# Patient Record
Sex: FEMALE | Race: WHITE | Employment: OTHER | URBAN - METROPOLITAN AREA
[De-identification: names, ages, dates, MRNs, and addresses within clinical notes are randomized per-mention and may not be internally consistent; named-entity substitution may affect disease eponyms.]

---

## 2020-12-10 ENCOUNTER — HOSPITAL ENCOUNTER (OUTPATIENT)
Dept: PREADMISSION TESTING | Age: 70
Discharge: HOME | End: 2020-12-10
Payer: MEDICARE

## 2020-12-10 PROCEDURE — 87635 SARS-COV-2 COVID-19 AMP PRB: CPT

## 2020-12-11 LAB — SARS-COV-2, COV2NT: NOT DETECTED

## 2020-12-14 ENCOUNTER — HOSPITAL ENCOUNTER (OUTPATIENT)
Age: 70
Setting detail: OUTPATIENT SURGERY
Discharge: HOME | End: 2020-12-14
Attending: INTERNAL MEDICINE | Admitting: INTERNAL MEDICINE
Payer: MEDICARE

## 2020-12-14 VITALS
SYSTOLIC BLOOD PRESSURE: 132 MMHG | RESPIRATION RATE: 18 BRPM | TEMPERATURE: 98.1 F | HEIGHT: 67 IN | OXYGEN SATURATION: 94 % | BODY MASS INDEX: 15.78 KG/M2 | DIASTOLIC BLOOD PRESSURE: 57 MMHG | HEART RATE: 70 BPM | WEIGHT: 100.56 LBS

## 2020-12-14 PROCEDURE — 88342 IMHCHEM/IMCYTCHM 1ST ANTB: CPT

## 2020-12-14 PROCEDURE — G0500 MOD SEDAT ENDO SERVICE >5YRS: HCPCS | Performed by: INTERNAL MEDICINE

## 2020-12-14 PROCEDURE — 77030039961 HC KT CUST COLON BSC -D: Performed by: INTERNAL MEDICINE

## 2020-12-14 PROCEDURE — 74011250636 HC RX REV CODE- 250/636: Performed by: INTERNAL MEDICINE

## 2020-12-14 PROCEDURE — 77030040361 HC SLV COMPR DVT MDII -B: Performed by: INTERNAL MEDICINE

## 2020-12-14 PROCEDURE — 88305 TISSUE EXAM BY PATHOLOGIST: CPT

## 2020-12-14 PROCEDURE — 2709999900 HC NON-CHARGEABLE SUPPLY: Performed by: INTERNAL MEDICINE

## 2020-12-14 PROCEDURE — 76040000008: Performed by: INTERNAL MEDICINE

## 2020-12-14 RX ORDER — SODIUM CHLORIDE 0.9 % (FLUSH) 0.9 %
5-40 SYRINGE (ML) INJECTION AS NEEDED
Status: CANCELLED | OUTPATIENT
Start: 2020-12-14

## 2020-12-14 RX ORDER — FENTANYL CITRATE 50 UG/ML
100 INJECTION, SOLUTION INTRAMUSCULAR; INTRAVENOUS
Status: DISCONTINUED | OUTPATIENT
Start: 2020-12-14 | End: 2020-12-14 | Stop reason: HOSPADM

## 2020-12-14 RX ORDER — ATROPINE SULFATE 0.1 MG/ML
0.5 INJECTION INTRAVENOUS
Status: CANCELLED | OUTPATIENT
Start: 2020-12-14 | End: 2020-12-15

## 2020-12-14 RX ORDER — DIPHENHYDRAMINE HYDROCHLORIDE 50 MG/ML
50 INJECTION, SOLUTION INTRAMUSCULAR; INTRAVENOUS ONCE
Status: CANCELLED | OUTPATIENT
Start: 2020-12-14 | End: 2020-12-14

## 2020-12-14 RX ORDER — LUBIPROSTONE 8 UG/1
8 CAPSULE, GELATIN COATED ORAL 2 TIMES DAILY WITH MEALS
Status: ON HOLD | COMMUNITY
End: 2022-08-02

## 2020-12-14 RX ORDER — EPINEPHRINE 0.1 MG/ML
1 INJECTION INTRACARDIAC; INTRAVENOUS
Status: CANCELLED | OUTPATIENT
Start: 2020-12-14 | End: 2020-12-15

## 2020-12-14 RX ORDER — FLUMAZENIL 0.1 MG/ML
0.2 INJECTION INTRAVENOUS
Status: CANCELLED | OUTPATIENT
Start: 2020-12-14 | End: 2020-12-14

## 2020-12-14 RX ORDER — SODIUM CHLORIDE 0.9 % (FLUSH) 0.9 %
5-40 SYRINGE (ML) INJECTION EVERY 8 HOURS
Status: CANCELLED | OUTPATIENT
Start: 2020-12-14

## 2020-12-14 RX ORDER — DEXTROMETHORPHAN/PSEUDOEPHED 2.5-7.5/.8
1.2 DROPS ORAL
Status: CANCELLED | OUTPATIENT
Start: 2020-12-14

## 2020-12-14 RX ORDER — NALOXONE HYDROCHLORIDE 0.4 MG/ML
0.4 INJECTION, SOLUTION INTRAMUSCULAR; INTRAVENOUS; SUBCUTANEOUS
Status: DISCONTINUED | OUTPATIENT
Start: 2020-12-14 | End: 2020-12-14 | Stop reason: HOSPADM

## 2020-12-14 RX ORDER — BISMUTH SUBSALICYLATE 262 MG
1 TABLET,CHEWABLE ORAL DAILY
COMMUNITY

## 2020-12-14 RX ORDER — SODIUM CHLORIDE 9 MG/ML
1000 INJECTION, SOLUTION INTRAVENOUS CONTINUOUS
Status: CANCELLED | OUTPATIENT
Start: 2020-12-14 | End: 2020-12-14

## 2020-12-14 RX ORDER — ALBUTEROL SULFATE 90 UG/1
AEROSOL, METERED RESPIRATORY (INHALATION)
COMMUNITY

## 2020-12-14 RX ORDER — SODIUM CHLORIDE 9 MG/ML
125 INJECTION, SOLUTION INTRAVENOUS CONTINUOUS
Status: DISCONTINUED | OUTPATIENT
Start: 2020-12-14 | End: 2020-12-14 | Stop reason: HOSPADM

## 2020-12-14 RX ORDER — MIDAZOLAM HYDROCHLORIDE 1 MG/ML
.25-5 INJECTION, SOLUTION INTRAMUSCULAR; INTRAVENOUS
Status: DISCONTINUED | OUTPATIENT
Start: 2020-12-14 | End: 2020-12-14 | Stop reason: HOSPADM

## 2020-12-14 RX ADMIN — SODIUM CHLORIDE 125 ML/HR: 900 INJECTION, SOLUTION INTRAVENOUS at 08:40

## 2020-12-14 NOTE — DISCHARGE INSTRUCTIONS
[facility]  [id number]  1950    EGD DISCHARGE INSTRUCTIONS  Discomfort:  Sore throat- throat lozenges or warm salt water gargle  redness at IV site- apply warm compress to area; if redness or soreness persist- contact your physician  Gaseous discomfort- walking, belching will help relieve any discomfort  You may not operate a vehicle until the next day  You may not engage in an occupation involving machinery or appliances until the next day  You may not drink alcoholic beverages until the next day  Avoid making any critical decisions for at least 24 hour    DIET   You may not resume your regular diet. Antireflux diet. ACTIVITY  You may not resume your normal daily activities   Spend the remainder of the day resting -  avoid any strenuous activity. CALL M.D. ANY SIGN OF   Increasing pain, nausea, vomiting  Abdominal distension (swelling)  New increased bleeding (oral or rectal)  Fever (chills)  Pain in chest area  Bloody discharge from nose or mouth  Shortness of breath     You may no take any Advil, Aspirin, Ibuprofen, Motrin, Aleve, or Goodys ONLY  Tylenol as needed for pain. Follow-up Instructions: Follow-up in the office as scheduled or make a follow-up appointment in 2 weeks. Citlali Higgins MD  December 14, 2020      DISCHARGE SUMMARY from Nurse    PATIENT INSTRUCTIONS:    After general anesthesia or intravenous sedation, for 24 hours or while taking prescription Narcotics:  · Limit your activities  · Do not drive and operate hazardous machinery  · Do not make important personal or business decisions  · Do  not drink alcoholic beverages  · If you have not urinated within 8 hours after discharge, please contact your surgeon on call.     Report the following to your surgeon:  · Excessive pain, swelling, redness or odor of or around the surgical area  · Temperature over 100.5  · Nausea and vomiting lasting longer than 4 hours or if unable to take medications  · Any signs of decreased circulation or nerve impairment to extremity: change in color, persistent  numbness, tingling, coldness or increase pain  · Any questions    What to do at Home:  Recommended activity: as above,         *  Please give a list of your current medications to your Primary Care Provider. *  Please update this list whenever your medications are discontinued, doses are      changed, or new medications (including over-the-counter products) are added. *  Please carry medication information at all times in case of emergency situations. These are general instructions for a healthy lifestyle:    No smoking/ No tobacco products/ Avoid exposure to second hand smoke  Surgeon General's Warning:  Quitting smoking now greatly reduces serious risk to your health. Obesity, smoking, and sedentary lifestyle greatly increases your risk for illness    A healthy diet, regular physical exercise & weight monitoring are important for maintaining a healthy lifestyle    You may be retaining fluid if you have a history of heart failure or if you experience any of the following symptoms:  Weight gain of 3 pounds or more overnight or 5 pounds in a week, increased swelling in our hands or feet or shortness of breath while lying flat in bed. Please call your doctor as soon as you notice any of these symptoms; do not wait until your next office visit. The discharge information has been reviewed with the patient and Felisha Coyle. The patient and Felisha Coyle verbalized understanding. Discharge medications reviewed with the patient and Felisha Coyle and appropriate educational materials and side effects teaching were provided.   ___________________________________________________________________________________________________________________________________    Patient armband removed and shredded

## 2020-12-14 NOTE — H&P
Assessment/Plan  # Detail Type Description    1. Assessment Dysphagia (R13.10). Patient Plan dysphagia to solids > liquid started again 2 to 3 months ago. It has been stable . she lost 12 lbs in the last 6 months by juana active and taking floraxatine prozac because she has been very depressed. her depression is better. EGD in 2016 was dilated to 40 Fr         2. Assessment Lower abdominal pain, unspecified (R10.30). Patient Plan for the past 10 years has been having intermittent severe lower abdominal cramping that lasts for 24 hours with a feeling of mid epigastric pain has to stay in fetal position with bloating every 10 to 15 minutes . the pain occurs about once a month during marcos time she feels nauseated with diarrhea. She is  she has 2 to 3 small soft to loose bm daily and sometimes it is every 2 days if she has not eaten enough. had 2 negative colonoscopies last by Dr Yue Omalley mild diverticulosis throughout the colon. For now recommended high fiber diet, Metamucil and Amitiza 8 mcg bid  To see in FU in one month  she had hemorrhoidectomy 25 to 30 years ago. she has rarely rectal bleeding if she strains a lot on the tissue                 This 69year old female presents for Dysphagia and Irritable bowel syndrome. History of Present Illness:  1. Dysphagia   The difficulty in swallowing began 6 months ago. The symptoms are worsened and occur occasionally. The symptoms occur with solids and liquids and cause choking and gagging. Additional information: BM . 2 x daily. 2.  Irritable bowel syndrome   Onset: 1 month ago. Patient description is cramping. It occurs monthly.           PROBLEM LIST:     Problem Description Onset Date Chronic Clinical Status Notes   Pseudodiverticulum of the rectum 08/10/2016 N     Congenital esophageal fistula 08/10/2016 N     Suprapubic pain 06/01/2016 N     Difficulty swallowing solids 06/01/2016 N     Acquired hypothyroidism 02/24/2014 Y     Hypothyroidism 2014 Y     Hyperlipidemia 2014 Y     Asthma 2014 Y     Depressive disorder 2014 Y     Elevated levels of transaminase & lactic acid dehydrogenase 2014 Y               PAST MEDICAL/SURGICAL HISTORY   (Detailed)    Disease/disorder Onset Date Management Date Comments   Dysphagia  EGD w/biopsy 2016      Colonoscopy 2005    Diverticular disease         shoulder surgery     Hip fracture         hemorrhoidectomy       Anal Fissure Repair x2           Family History  (Detailed)  Relationship Family Member Name  Age at Death Condition Onset Age Cause of Death   Father  Y  Dementia  N   Mother  Y  Heart disease; Pacemaker  N         Social History:  (Detailed)  Tobacco use reviewed. Preferred language is [place]. MARITAL STATUS/FAMILY/SOCIAL SUPPORT  Marital status: Single   Tobacco use status: Never smoked tobacco.  Smoking status: Never smoker. TOBACCO SCREENING:  Patient has never used tobacco. Patient has not used tobacco in the last 30 days. Patient has not used smokeless tobacco in the last 30 days. SMOKING STATUS  Type Smoking Status Usage Per Day Years Used Pack Years Total Pack Years    Never smoker         TOBACCO/VAPING EXPOSURE  No passive smoke exposure. ALCOHOL  There is a history of alcohol use. Type: Wine. 2 glasses consumed daily. CAFFEINE  The patient uses caffeine: coffee. LIFESTYLE  Moderate activity level. Exercises 3-4 times a week. DIET  healthy.             Medications (active prior to today)  Medication Name Sig Description Start Date Stop Date Refilled Rx Elsewhere   docusate sodium 100 mg tablet take 1 tablet by oral route  every day at bedtime as needed 2016   N   hydrocortisone 1 % topical cream with perineal applicator apply by topical route 2- 4 times every day a thin layer to the affected area(s) 2020   N   LEVOTHYROXINE 25 MCG TABLET take 1 tablet by mouth daily 2020 N   lidocaine 5 % topical patch apply 1 patch by transdermal route  every day (May wear up to 12hours.) 04/06/2020  40/72/4861 N   BYSTOLIC 5 MG TABLET take 1 tablet by mouth once daily 06/11/2020 06/11/2020 N   omeprazole 20 mg capsule,delayed release TAKE 1 CAPSULE BY ORAL ROUTE EVERY DAY BEFORE A MEAL 07/14/2020 07/14/2020 N   Vitamin D3 25 mcg (1,000 unit) capsule Take 1 Tablet By Oral Route Daily 07/28/2020   N   Centrum Chewables 8 mg iron-400 mcg-10 mcg tablet  07/28/2020   N   albuterol sulfate HFA 90 mcg/actuation aerosol inhaler inhale 2 puff by inhalation route  every 4 - 6 hours as needed 44/66/5855   N   Bystolic 5 mg tablet take 1 tablet by oral route  every day 07/28/2020   N   fluoxetine 20 mg capsule TAKE ONE CAPSULE BY MOUTH EVERY MORNING 09/09/2020 11/24/2020 09/09/2020 N   temazepam 22.5 mg capsule take 1 capsule by oral route  every day at bedtime as needed for insomnia 10/12/2020  10/12/2020 N   meloxicam 7.5 mg tablet take 1 tablet by oral route  every day as needed for arthritis pain 11/18/2020 11/18/2020 N   hydrocortisone 2.5 % topical cream apply by topical route 2-4 times every day a thin layer to the affected area(s) 11/23/2020 11/23/2020 N     Medication Reconciliation  Medications reconciled today.   Medication Reviewed  Adherence Medication Name Sig Desc Elsewhere Status   taking as directed docusate sodium 100 mg tablet take 1 tablet by oral route  every day at bedtime as needed N Verified   taking as directed hydrocortisone 1 % topical cream with perineal applicator apply by topical route 2- 4 times every day a thin layer to the affected area(s) N Verified   taking as directed LEVOTHYROXINE 25 MCG TABLET take 1 tablet by mouth daily N Verified   taking as directed lidocaine 5 % topical patch apply 1 patch by transdermal route  every day (May wear up to 12hours.) N Verified   taking as directed BYSTOLIC 5 MG TABLET take 1 tablet by mouth once daily N Verified   taking as directed omeprazole 20 mg capsule,delayed release TAKE 1 CAPSULE BY ORAL ROUTE EVERY DAY BEFORE A MEAL N Verified   taking as directed Vitamin D3 25 mcg (1,000 unit) capsule Take 1 Tablet By Oral Route Daily N Verified   taking as directed Centrum Chewables 8 mg iron-400 mcg-10 mcg tablet  N Verified   taking as directed albuterol sulfate HFA 90 mcg/actuation aerosol inhaler inhale 2 puff by inhalation route  every 4 - 6 hours as needed N Verified   taking as directed Bystolic 5 mg tablet take 1 tablet by oral route  every day N Verified   taking as directed temazepam 22.5 mg capsule take 1 capsule by oral route  every day at bedtime as needed for insomnia N Verified   taking as directed meloxicam 7.5 mg tablet take 1 tablet by oral route  every day as needed for arthritis pain N Verified   taking as directed hydrocortisone 2.5 % topical cream apply by topical route 2-4 times every day a thin layer to the affected area(s) N Verified     Medications (Added, Continued or Stopped today)  Start Date Medication Directions PRN Status PRN Reason Instruction Stop Date   07/28/2020 albuterol sulfate HFA 90 mcg/actuation aerosol inhaler inhale 2 puff by inhalation route  every 4 - 6 hours as needed N      11/24/2020 Amitiza 8 mcg capsule take 1 capsule by oral route 2 times every day with food and water N      65/53/7089 BYSTOLIC 5 MG TABLET take 1 tablet by mouth once daily N      97/43/3338 Bystolic 5 mg tablet take 1 tablet by oral route  every day N      07/28/2020 Centrum Chewables 8 mg iron-400 mcg-10 mcg tablet  N      05/31/2016 docusate sodium 100 mg tablet take 1 tablet by oral route  every day at bedtime as needed N      09/09/2020 fluoxetine 20 mg capsule TAKE ONE CAPSULE BY MOUTH EVERY MORNING N   11/24/2020 01/28/2020 hydrocortisone 1 % topical cream with perineal applicator apply by topical route 2- 4 times every day a thin layer to the affected area(s) N      11/23/2020 hydrocortisone 2.5 % topical cream apply by topical route 2-4 times every day a thin layer to the affected area(s) N      01/30/2020 LEVOTHYROXINE 25 MCG TABLET take 1 tablet by mouth daily N      11/24/2020 Levsin/SL 0.125 mg sublingual tablet take 1 tablet by oral route  every 3 hours as needed for abdominal pain N      04/06/2020 lidocaine 5 % topical patch apply 1 patch by transdermal route  every day (May wear up to 12hours. ) N      11/18/2020 meloxicam 7.5 mg tablet take 1 tablet by oral route  every day as needed for arthritis pain N      07/14/2020 omeprazole 20 mg capsule,delayed release TAKE 1 CAPSULE BY ORAL ROUTE EVERY DAY BEFORE A MEAL N      10/12/2020 temazepam 22.5 mg capsule take 1 capsule by oral route  every day at bedtime as needed for insomnia N      07/28/2020 Vitamin D3 25 mcg (1,000 unit) capsule Take 1 Tablet By Oral Route Daily N        Allergies:  Ingredient Reaction (Severity) Medication Name Comment   CITALOPRAM nausea; dizzy; drowsiness     CODEINE gi distress (Unknown)     Reviewed, no changes. Date/Time  Temp  Pulse  BP  MAP (Calculated)  Arterial Line 1 BP (mmHg)  BP Patient Position  Resp  SpO2  O2 Device  O2 Flow Rate (L/min)  Pre/Post Ductal  Weight    12/14/20 0855  --  73  196/81Abnormal    119  --  --  0Abnormal    98 %  --  --  --  --    12/14/20 0850  --  71  186/86Abnormal    119  --  --  --  --  --  --  --  --    12/14/20 0818  97.8 °F (36.6 °C)  78  166/84Abnormal    111  --  --  18  97 %  Room air  --  --  45.6 kg (100 lb 9 oz)          PHYSICAL EXAM:  Exam Findings Details   Constitutional Normal No acute distress. Well Nourished. Well developed.    Eyes Normal General - Right: Normal, Left: Normal. Conjunctiva - Right: Normal, Left: Normal. Sclera - Right: Normal, Left: Normal. Cornea - Right: Normal, Left: Normal. Pupil - Right: Normal, Left: Normal.   Nose/Mouth/Throat Normal Lips/teeth/gums - Normal. Tongue - Normal. Buccal mucosa - Normal. Palate & uvula - Normal.   Neck Exam Normal Inspection - Normal. Palpation - Normal. Thyroid gland - Normal. Cervical lymph nodes - Normal.   Respiratory Normal Inspection - Normal. Auscultation - Normal. Percussion - Normal. Cough - Absent. Effort - Normal.   Cardiovascular Normal Heart rate - Regular rate. Heart sounds - Normal S1, Normal S2. Murmurs - None. Extremities - No edema. Abdomen Normal Inspection - Normal. Appliance(s) - None. Abdominal muscles - Normal. Auscultation - Normal. Percussion - Normal. Anterior palpation - Normal, No guarding, No rebound. CVA tenderness - None. Umbilicus - Normal. Abdominal reflexes - Normal. No abdominal tenderness. No hepatic enlargement. No splenic enlargement. No hernia. No Ascites. No palpable mass. Adams's sign - Negative. Skin Normal Inspection - Normal.   Musculoskeletal Normal Hands - Left: Normal, Right: Normal.   Extremity Normal No cyanosis. No edema. Clubbing - Absent. Neurological Normal Level of consciousness - Normal. Orientation - Normal. Memory - Normal. Motor - Normal. Balance & gait - Normal. Coordination - Normal. Fine motor skills - Normal. DTRs - Normal.   Psychiatric Normal Orientation - Oriented to time, place, person & situation. Not anxious. Appropriate mood and affect. Behavior appropriate for age. Sufficient language. No memory loss.        Immunizations Entered by History    Date Immunization   10/8/2020 12:00:00 AM FLUAD QUAD [id number] [facility]   10/1/2019 12:00:00 AM FLUAD [id number] [place]   10/3/2017 12:00:00 AM Flu (3 yrs or older)   10/25/2016 12:00:00 AM Flu (3 yrs or older)   9/12/2013 12:00:00 AM flu (split) (3 yrs or older)     Active Patient Care Team Members    Name Contact Agency Type Support Role Relationship Active Date Inactive Date Specialty   [facility]   Patient provider PCP   [facility]   encounter provider    Gastroenterology   Lupe Moyer   Emergency Contact Extended Family      nelda Clementina [place]       Dermatology     Dann change in H&P

## 2020-12-14 NOTE — PROCEDURES
(EGD) Esophagogastroduodenoscopy (UPPER ENDOSCOPY) Procedure Note  [facility] [place]  __________________________________________________________________________________________________________________________      12/14/2020     Patient: Lena Randall YOB: 1950 Gender: female Age: 69 y.o. INDICATION:  dysphagia to solids > liquid started again 2 to 3 months ago. It has been stable . she lost 12 lbs in the last 6 months by juana active and taking floraxatine prozac because she has been very depressed. her depression is better. EGD in 7/20/ 2016 diffuse narrowing from scarring  In the distal esophagus more severe at the EG junction not allowing the passage of the 10 mm gastroscope. Stenosis was dilated with a gradual balloon 10 to 12 mm and then Begum bougies 38 and 40 Fr. There was very tiny hiatal hernia less than one cm. She has been taking Nexium 20 mg daily for at least 5 years yet she claims never had heartburn. : Jordon Cagle MD    Referring Provider:  Stephanie Bruce MD    Sedation:  Versed 5 mg IV, Fentanyl 200 mcg IV    Procedure Details:  After infomed consent was obtained for the procedure, with all risks and benefits of procedure explained to the family the patient was taken to the endoscopy suite and placed in the left lateral decubitus position. Following sequential administration of sedation as per above, the endoscope was inserted into the mouth and advanced under direct vision to third portion of the duodenum. A careful inspection was made as the gastroscope was withdrawn, including a retroflexed view of the proximal stomach; findings and interventions are described below. OROPHARYNX: The vocal Cords and the larynx are normal.   ESOPHAGUS: The proximal and mid are normal. Mild stenotic scarring in the distal esophagus. The Z-Line is moderately stenotic but horizontal permitting this time the passage of the gastroscope.  It was dilated with Begum bougies # 34, 36, 38, 40 and 42 Fr on repeat endoscopy there is only one minor mucosal tear at the ring level. Another 40 Fr Begum bougie is passed all without great resistance. There is a tiny hiatal hernia. The diaphragmatic hiatus is located at 41 cm. STOMACH: The fundus on antegrade and retroflex views is normal. The cardia, body, lesser curvature, greater curvature, the antrum, and pylorus are normal. There is possibly mild diffuse gastritis more noticeable in  The  Antrum. 5 biopsies are taken. DUODENUM:  There is mild stenotic scarring in the distal bulb, suggestive of previous well healed PUD. There are 2 successive medium and large diverticulum in the mid D2. The  third, fourth portions are normal.  The major papilla is not readily seen. Therapies:  Gastric biopsy. Esophageal  Dilatation. Specimen: one           Complications:   None    EBL:  Minimal  IMPRESSION: Mild stenotic scarring in the distal esophagus. The Z-Line is moderately stenotic but horizontal permitting this time the passage of the gastroscope. It was dilated with [place] bougies # 34, 36, 38, 40 and 42 Fr on repeat endoscopy there is only one minor mucosal tear at the ring level. Another 40 Fr Begum bougie is passed all without great resistance. There is a tiny hiatal hernia. There is possibly mild diffuse gastritis more noticeable in  The  Antrum. 5 biopsies are taken. There is mild stenotic scarring in the distal bulb, suggestive of previous well healed PUD. There are 2 successive medium and large diverticulum in the mid D2          RECOMMENDATION:  may resume antireflux diet. Avoid all NSAID's. Make a FU appointment at the office. continue taking Nexium 20 mg daily for life.     Assistant: None    --Lewis Vasquez MD on 12/14/2020 at 9:20 AM

## 2022-04-21 ENCOUNTER — APPOINTMENT (OUTPATIENT)
Dept: CT IMAGING | Age: 72
End: 2022-04-21
Attending: PHYSICIAN ASSISTANT
Payer: MEDICARE

## 2022-04-21 ENCOUNTER — APPOINTMENT (OUTPATIENT)
Dept: GENERAL RADIOLOGY | Age: 72
End: 2022-04-21
Attending: PHYSICIAN ASSISTANT
Payer: MEDICARE

## 2022-04-21 ENCOUNTER — HOSPITAL ENCOUNTER (EMERGENCY)
Age: 72
Discharge: HOME | End: 2022-04-21
Attending: EMERGENCY MEDICINE
Payer: MEDICARE

## 2022-04-21 VITALS
SYSTOLIC BLOOD PRESSURE: 145 MMHG | DIASTOLIC BLOOD PRESSURE: 56 MMHG | TEMPERATURE: 98.6 F | RESPIRATION RATE: 18 BRPM | OXYGEN SATURATION: 100 % | HEART RATE: 82 BPM | WEIGHT: 104 LBS | BODY MASS INDEX: 16.71 KG/M2 | HEIGHT: 66 IN

## 2022-04-21 DIAGNOSIS — Z87.19 HISTORY OF DYSPHAGIA: ICD-10-CM

## 2022-04-21 DIAGNOSIS — J98.01 ACUTE BRONCHOSPASM: Primary | ICD-10-CM

## 2022-04-21 LAB
ANION GAP SERPL CALC-SCNC: 6 MMOL/L (ref 3–18)
BASOPHILS # BLD: 0 K/UL (ref 0–0.1)
BASOPHILS NFR BLD: 0 % (ref 0–2)
BUN SERPL-MCNC: 21 MG/DL (ref 7–18)
BUN/CREAT SERPL: 36 (ref 12–20)
CALCIUM SERPL-MCNC: 9 MG/DL (ref 8.5–10.1)
CHLORIDE SERPL-SCNC: 108 MMOL/L (ref 100–111)
CO2 SERPL-SCNC: 27 MMOL/L (ref 21–32)
CREAT SERPL-MCNC: 0.58 MG/DL (ref 0.6–1.3)
DIFFERENTIAL METHOD BLD: ABNORMAL
EOSINOPHIL # BLD: 0 K/UL (ref 0–0.4)
EOSINOPHIL NFR BLD: 0 % (ref 0–5)
ERYTHROCYTE [DISTWIDTH] IN BLOOD BY AUTOMATED COUNT: 13.7 % (ref 11.6–14.5)
GLUCOSE SERPL-MCNC: 83 MG/DL (ref 74–99)
HCT VFR BLD AUTO: 38.4 % (ref 35–45)
HGB BLD-MCNC: 12.4 G/DL (ref 12–16)
IMM GRANULOCYTES # BLD AUTO: 0 K/UL (ref 0–0.04)
IMM GRANULOCYTES NFR BLD AUTO: 1 % (ref 0–0.5)
LYMPHOCYTES # BLD: 1 K/UL (ref 0.9–3.6)
LYMPHOCYTES NFR BLD: 25 % (ref 21–52)
MCH RBC QN AUTO: 30.8 PG (ref 24–34)
MCHC RBC AUTO-ENTMCNC: 32.3 G/DL (ref 31–37)
MCV RBC AUTO: 95.5 FL (ref 78–100)
MONOCYTES # BLD: 0.4 K/UL (ref 0.05–1.2)
MONOCYTES NFR BLD: 10 % (ref 3–10)
NEUTS SEG # BLD: 2.5 K/UL (ref 1.8–8)
NEUTS SEG NFR BLD: 63 % (ref 40–73)
NRBC # BLD: 0 K/UL (ref 0–0.01)
NRBC BLD-RTO: 0 PER 100 WBC
PLATELET # BLD AUTO: 131 K/UL (ref 135–420)
PMV BLD AUTO: 9.4 FL (ref 9.2–11.8)
POTASSIUM SERPL-SCNC: 3.5 MMOL/L (ref 3.5–5.5)
RBC # BLD AUTO: 4.02 M/UL (ref 4.2–5.3)
SODIUM SERPL-SCNC: 141 MMOL/L (ref 136–145)
WBC # BLD AUTO: 4 K/UL (ref 4.6–13.2)

## 2022-04-21 PROCEDURE — 74011000250 HC RX REV CODE- 250: Performed by: PHYSICIAN ASSISTANT

## 2022-04-21 PROCEDURE — 80048 BASIC METABOLIC PNL TOTAL CA: CPT

## 2022-04-21 PROCEDURE — 96374 THER/PROPH/DIAG INJ IV PUSH: CPT

## 2022-04-21 PROCEDURE — 71045 X-RAY EXAM CHEST 1 VIEW: CPT

## 2022-04-21 PROCEDURE — 99284 EMERGENCY DEPT VISIT MOD MDM: CPT

## 2022-04-21 PROCEDURE — 71250 CT THORAX DX C-: CPT

## 2022-04-21 PROCEDURE — 74011250636 HC RX REV CODE- 250/636: Performed by: PHYSICIAN ASSISTANT

## 2022-04-21 PROCEDURE — 85025 COMPLETE CBC W/AUTO DIFF WBC: CPT

## 2022-04-21 RX ORDER — IPRATROPIUM BROMIDE AND ALBUTEROL SULFATE 2.5; .5 MG/3ML; MG/3ML
3 SOLUTION RESPIRATORY (INHALATION)
Status: COMPLETED | OUTPATIENT
Start: 2022-04-21 | End: 2022-04-21

## 2022-04-21 RX ORDER — PREDNISONE 50 MG/1
50 TABLET ORAL DAILY
Qty: 3 TABLET | Refills: 0 | Status: SHIPPED | OUTPATIENT
Start: 2022-04-21 | End: 2022-04-24

## 2022-04-21 RX ADMIN — METHYLPREDNISOLONE SODIUM SUCCINATE 125 MG: 125 INJECTION, POWDER, FOR SOLUTION INTRAMUSCULAR; INTRAVENOUS at 19:21

## 2022-04-21 RX ADMIN — IPRATROPIUM BROMIDE AND ALBUTEROL SULFATE 3 ML: .5; 3 SOLUTION RESPIRATORY (INHALATION) at 18:42

## 2022-04-21 RX ADMIN — IPRATROPIUM BROMIDE AND ALBUTEROL SULFATE 3 ML: .5; 3 SOLUTION RESPIRATORY (INHALATION) at 19:21

## 2022-04-21 NOTE — ED PROVIDER NOTES
EMERGENCY DEPARTMENT HISTORY AND PHYSICAL EXAM    Date: 4/21/2022  Patient Name: Dewey Pereira    History of Presenting Illness     Chief Complaint   Patient presents with    Aspiration         History Provided By: Patient    6:08 PM  Dewey Pereira is a 66 y.o. female with PMHX of hypertension, hypothyroidism, GERD who presents to the emergency department C/O cough and wheezing which began around 3 PM.  Patient states she swallowed her usual Bystolic pill with water but it did not immediately \"go down\" so she took another step and then started coughing. She feels that she may have aspirated the water pill into her lungs as she is now has wheezing and coughing since then. She went to [facility] urgent care and was sent to ED for further evaluation. Patient states this is happened several times before. She has seen GI Dr. Rashid Hutchison and required esophageal dilation in the past. She thinks she has had a bronchoscopy for foreign body aspiration in the past as well. Pt denies fever, ear pain, neck or chest pain, vomiting and any other sxs or complaints. PCP: Christoper Duane, DO    Current Outpatient Medications   Medication Sig Dispense Refill    predniSONE (DELTASONE) 50 mg tablet Take 1 Tablet by mouth daily for 3 days. 3 Tablet 0    MELOXICAM PO Take  by mouth as needed.  lubiPROStone (Amitiza) 8 mcg capsule Take 8 mcg by mouth two (2) times daily (with meals).  albuterol (PROVENTIL HFA, VENTOLIN HFA, PROAIR HFA) 90 mcg/actuation inhaler Take  by inhalation.  multivitamin (ONE A DAY) tablet Take 1 Tab by mouth daily.  esomeprazole (NEXIUM) 20 mg capsule Take 20 mg by mouth daily. Indications: gastroesophageal reflux disease      cyclobenzaprine (FLEXERIL) 10 mg tablet Take 10 mg by mouth as needed for Muscle Spasm(s). Indications: MUSCLE SPASM      wheat dextrin 3 gram/3.5 gram powd Take  by mouth daily.  nebivolol (BYSTOLIC) 5 mg tablet Take 5 mg by mouth daily. Indications: HYPERTENSION, LBBB      levothyroxine (SYNTHROID) 25 mcg tablet Take 25 mcg by mouth Daily (before breakfast). Indications: HYPOTHYROIDISM      temazepam (RESTORIL) 15 mg capsule Take 15 mg by mouth nightly as needed for Sleep.  docusate sodium (COLACE) 100 mg capsule Take 100 mg by mouth daily.  OTHER caltrate one tablet daily         Past History     Past Medical History:  Past Medical History:   Diagnosis Date    Arthritis     GERD (gastroesophageal reflux disease)     Hypercholesterolemia     Hypertension     (2016) over 5 years    Hypertriglyceridemia     Left bundle branch block     PUD (peptic ulcer disease)     H/O    Thyroid disease     hypo       Past Surgical History:  Past Surgical History:   Procedure Laterality Date    HX GI      hemorrhoidectomy    HX GI      anal fistulectomy x 2    HX GI  2015    colonoscopy    HX GYN      exc. Bartholin gland    HX ORTHOPAEDIC Left     Hip nailing    HX ORTHOPAEDIC Right     open shoulder surgery    HX ORTHOPAEDIC Right     ORIF FOOT        Family History:  History reviewed. No pertinent family history. Social History:  Social History     Tobacco Use    Smoking status: Former Smoker     Quit date: 7/15/1971     Years since quittin.8    Smokeless tobacco: Never Used   Substance Use Topics    Alcohol use: Yes     Alcohol/week: 11.7 standard drinks     Types: 14 Glasses of wine per week     Comment: 2 PER NIGHT    Drug use: No       Allergies:  No Known Allergies      Review of Systems   Review of Systems   Constitutional: Negative. Negative for fever. Respiratory: Positive for cough and wheezing. Cardiovascular: Negative for chest pain. Gastrointestinal: Negative for vomiting. All other systems reviewed and are negative.         Physical Exam     Vitals:    22 1848 22 1858 22 1912 22 1959   BP:    (!) 145/56   Pulse:       Resp:       Temp:       SpO2: 100% 100% 96% 100%   Weight: Height:         Physical Exam    Vital signs and nursing notes reviewed. CONSTITUTIONAL: Alert. Well-appearing; thin female; occasional cough and appears slightly uncomfortable but not in distress. HEAD: Normocephalic; atraumatic. EYES: Conjunctiva clear. ENT: Normal pharynx. Moist mucus membranes. NECK: Supple; FROM without difficulty, non-tender; no cervical lymphadenopathy. CV: Normal S1, S2; no murmurs, rubs, or gallops. No chest wall tenderness. RESPIRATORY: Normal chest excursion with respiration; coarse cough with expiratory wheezes bilaterally. SKIN: Normal for age and race; warm; dry; good turgor; no apparent lesions or exudate. NEURO: A & O x3. PSYCH:  Mood and affect appropriate. Diagnostic Study Results     Labs -     Recent Results (from the past 12 hour(s))   CBC WITH AUTOMATED DIFF    Collection Time: 04/21/22  6:25 PM   Result Value Ref Range    WBC 4.0 (L) 4.6 - 13.2 K/uL    RBC 4.02 (L) 4.20 - 5.30 M/uL    HGB 12.4 12.0 - 16.0 g/dL    HCT 38.4 35.0 - 45.0 %    MCV 95.5 78.0 - 100.0 FL    MCH 30.8 24.0 - 34.0 PG    MCHC 32.3 31.0 - 37.0 g/dL    RDW 13.7 11.6 - 14.5 %    PLATELET 297 (L) 390 - 420 K/uL    MPV 9.4 9.2 - 11.8 FL    NRBC 0.0 0  WBC    ABSOLUTE NRBC 0.00 0.00 - 0.01 K/uL    NEUTROPHILS 63 40 - 73 %    LYMPHOCYTES 25 21 - 52 %    MONOCYTES 10 3 - 10 %    EOSINOPHILS 0 0 - 5 %    BASOPHILS 0 0 - 2 %    IMMATURE GRANULOCYTES 1 (H) 0.0 - 0.5 %    ABS. NEUTROPHILS 2.5 1.8 - 8.0 K/UL    ABS. LYMPHOCYTES 1.0 0.9 - 3.6 K/UL    ABS. MONOCYTES 0.4 0.05 - 1.2 K/UL    ABS. EOSINOPHILS 0.0 0.0 - 0.4 K/UL    ABS. BASOPHILS 0.0 0.0 - 0.1 K/UL    ABS. IMM.  GRANS. 0.0 0.00 - 0.04 K/UL    DF AUTOMATED     METABOLIC PANEL, BASIC    Collection Time: 04/21/22  6:25 PM   Result Value Ref Range    Sodium 141 136 - 145 mmol/L    Potassium 3.5 3.5 - 5.5 mmol/L    Chloride 108 100 - 111 mmol/L    CO2 27 21 - 32 mmol/L    Anion gap 6 3.0 - 18 mmol/L    Glucose 83 74 - 99 mg/dL    BUN 21 (H) 7.0 - 18 MG/DL    Creatinine 0.58 (L) 0.6 - 1.3 MG/DL    BUN/Creatinine ratio 36 (H) 12 - 20      GFR est AA >60 >60 ml/min/1.73m2    GFR est non-AA >60 >60 ml/min/1.73m2    Calcium 9.0 8.5 - 10.1 MG/DL       Radiologic Studies -   CT CHEST WO CONT   Final Result      No acute findings in the chest. Specifically, there is no evidence of   aspiration. _______________      XR CHEST PORT   Final Result      No acute process. _______________           CT Results  (Last 48 hours)               04/21/22 1943  CT CHEST WO CONT Final result    Impression:      No acute findings in the chest. Specifically, there is no evidence of   aspiration. _______________       Narrative:  EXAM: CT CHEST WO CONT. CLINICAL INDICATION/HISTORY: cough and wheezing s/p swallowing/coughing while   ingesting a triangular shaped Bystolic tablet.   -Additional: None. COMPARISON: Same-day radiograph. TECHNIQUE: Unenhanced CT imaging of the chest was performed, after which   sagittal and coronal reformats were rendered from axial source images. One or more dose reduction techniques were used on this CT: automated exposure   control, adjustment of the mAs and/or kVp according to patient size, and   iterative reconstruction techniques. The specific techniques used on this CT   exam have been documented in the patient's electronic medical record. Digital   Imaging and Communications in Medicine (DICOM) format image data are available   to nonaffiliated external healthcare facilities or entities on a secure, media   free, reciprocally searchable basis with patient authorization for at least a   12-month period after this study. _______________       FINDINGS:       LUNGS AND PLEURA: There are mild bibasilar hypoventilatory changes. There is no   focal airspace consolidation, pneumothorax, or pleural effusion. No suspicious   pulmonary nodules or masses are identified.        CENTRAL AIRWAYS: Patent. VESSELS: Aortic atherosclerotic changes without aneurysm. HEART AND PERICARDIUM: Heart size is normal. No pericardial effusion. MEDIASTINUM AND ARELY: Within normal limits. CHEST WALL AND LOWER NECK: Within normal limits. AXILLA: Within normal limits. UPPER ABDOMEN: Limited visualization of the upper abdominal contents reveals no   significant acute findings. BONES: Degenerative changes with no acute or suspicious osseous findings. _______________               CXR Results  (Last 48 hours)               04/21/22 1840  XR CHEST PORT Final result    Impression:      No acute process. _______________           Narrative:  EXAM: XR CHEST PORT. CLINICAL INDICATION/HISTORY: cough and wheezing after swallowing a pill.   -Additional: None. TECHNIQUE: A portable erect AP radiographic view of the chest.       COMPARISON: None.   _______________       FINDINGS:       Lungs and pleural spaces:   > The lungs are essentially clear.   > No pneumothorax or appreciable significant pleural effusion. Cardiomediastinal silhouette:   > Normal for age. Bones:   > No acute findings, noting chronic bilateral right-sided rib fracture   deformities. Other:   > None.   _______________                 Medications given in the ED-  Medications   albuterol-ipratropium (DUO-NEB) 2.5 MG-0.5 MG/3 ML (3 mL Nebulization Given 4/21/22 1842)   methylPREDNISolone (PF) (Solu-MEDROL) injection 125 mg (125 mg IntraVENous Given 4/21/22 1921)   albuterol-ipratropium (DUO-NEB) 2.5 MG-0.5 MG/3 ML (3 mL Nebulization Given 4/21/22 1921)         Medical Decision Making   I am the first provider for this patient. I reviewed the vital signs, available nursing notes, past medical history, past surgical history, family history and social history. Vital Signs-Reviewed the patient's vital signs.     Pulse Oximetry Analysis - 97% on RA       Records Reviewed: Nursing Notes      Procedures:  Procedures    ED Course:  6:08 PM   Initial assessment performed. The patients presenting problems have been discussed, and they are in agreement with the care plan formulated and outlined with them. I have encouraged them to ask questions as they arise throughout their visit. 7:18 PM progress note  Patient feels moderately better after neb treatment. Coughing significantly less, now with mild expiratory wheezes at the bilateral bases, improved. Chest x-ray shows no acute abnormalities. Will get CT of the chest to assess for possible tracheal/bronchial foreign body but felt to be less likely. 9:12 PM progress  CT chest negative for acute abnormality, foreign body or aspiration. Patient much better after nebs and steroids. Symptoms are most consistent with a bronchospasm related to coughing while drinking water. She does have history of dysphagia requiring EGD and esophageal dilatation, no significant recent difficulty with solids or liquids but sometimes has trouble with larger pills. Therefore I recommended she follow-up with her gastroenterologist to further explore this as it has been over 2 years since her last EGD. Diagnosis and Disposition       DISCHARGE NOTE:    Alex Conti's  results have been reviewed with her. She has been counseled regarding her diagnosis, treatment, and plan. She verbally conveys understanding and agreement of the signs, symptoms, diagnosis, treatment and prognosis and additionally agrees to follow up as discussed. She also agrees with the care-plan and conveys that all of her questions have been answered. I have also provided discharge instructions for her that include: educational information regarding their diagnosis and treatment, and list of reasons why they would want to return to the ED prior to their follow-up appointment, should her condition change.  She has been provided with education for proper emergency department utilization. CLINICAL IMPRESSION:    1. Acute bronchospasm    2. History of dysphagia        PLAN:  1. D/C Home  2. Current Discharge Medication List      START taking these medications    Details   predniSONE (DELTASONE) 50 mg tablet Take 1 Tablet by mouth daily for 3 days. Qty: 3 Tablet, Refills: 0  Start date: 4/21/2022, End date: 4/24/2022           3. Follow-up Information     Follow up With Specialties Details Why Contact Info    Mark Rosales DO Family Medicine Schedule an appointment as soon as possible for a visit   [address] [place] [id number] [phone]      Praveena Wisdom MD Gastroenterology Schedule an appointment as soon as possible for a visit   [address] [place] [address] \[facility] [place] [facility]\"" [address] [facility] EMERGENCY DEPT Emergency Medicine  As needed, If symptoms worsen [address] [place] [place] [address]  [phone]        _______________________________      Please note that this dictation was completed with Warp 9, the computer voice recognition software. Quite often unanticipated grammatical, syntax, homophones, and other interpretive errors are inadvertently transcribed by the computer software. Please disregard these errors. Please excuse any errors that have escaped final proofreading.

## 2022-07-14 ENCOUNTER — TRANSCRIBE ORDER (OUTPATIENT)
Dept: SCHEDULING | Age: 72
End: 2022-07-14

## 2022-07-14 DIAGNOSIS — R13.10 DYSPHAGIA: Primary | ICD-10-CM

## 2022-07-18 ENCOUNTER — HOSPITAL ENCOUNTER (OUTPATIENT)
Dept: GENERAL RADIOLOGY | Age: 72
Discharge: HOME | End: 2022-07-18
Attending: INTERNAL MEDICINE
Payer: MEDICARE

## 2022-07-18 DIAGNOSIS — R13.10 DYSPHAGIA: ICD-10-CM

## 2022-07-18 PROCEDURE — 74220 X-RAY XM ESOPHAGUS 1CNTRST: CPT

## 2022-07-18 PROCEDURE — 74011000250 HC RX REV CODE- 250: Performed by: INTERNAL MEDICINE

## 2022-07-18 RX ADMIN — BARIUM SULFATE 176 G: 960 POWDER, FOR SUSPENSION ORAL at 09:15

## 2022-07-18 RX ADMIN — BARIUM SULFATE 135 ML: 980 POWDER, FOR SUSPENSION ORAL at 09:16

## 2022-07-18 RX ADMIN — ANTACID/ANTIFLATULENT 4 G: 380; 550; 10; 10 GRANULE, EFFERVESCENT ORAL at 09:16

## 2022-07-18 RX ADMIN — BARIUM SULFATE 700 MG: 700 TABLET ORAL at 09:25

## 2022-08-02 ENCOUNTER — HOSPITAL ENCOUNTER (OUTPATIENT)
Age: 72
Setting detail: OUTPATIENT SURGERY
Discharge: HOME | End: 2022-08-02
Attending: INTERNAL MEDICINE | Admitting: INTERNAL MEDICINE
Payer: MEDICARE

## 2022-08-02 VITALS
HEIGHT: 66 IN | SYSTOLIC BLOOD PRESSURE: 114 MMHG | RESPIRATION RATE: 16 BRPM | BODY MASS INDEX: 16.02 KG/M2 | HEART RATE: 73 BPM | OXYGEN SATURATION: 95 % | DIASTOLIC BLOOD PRESSURE: 48 MMHG | WEIGHT: 99.7 LBS | TEMPERATURE: 97.9 F

## 2022-08-02 PROCEDURE — 74011250636 HC RX REV CODE- 250/636: Performed by: INTERNAL MEDICINE

## 2022-08-02 PROCEDURE — G0500 MOD SEDAT ENDO SERVICE >5YRS: HCPCS | Performed by: INTERNAL MEDICINE

## 2022-08-02 PROCEDURE — 2709999900 HC NON-CHARGEABLE SUPPLY: Performed by: INTERNAL MEDICINE

## 2022-08-02 PROCEDURE — 77030040361 HC SLV COMPR DVT MDII -B: Performed by: INTERNAL MEDICINE

## 2022-08-02 PROCEDURE — 77030039961 HC KT CUST COLON BSC -D: Performed by: INTERNAL MEDICINE

## 2022-08-02 PROCEDURE — 99153 MOD SED SAME PHYS/QHP EA: CPT | Performed by: INTERNAL MEDICINE

## 2022-08-02 PROCEDURE — 76040000007: Performed by: INTERNAL MEDICINE

## 2022-08-02 RX ORDER — FLUMAZENIL 0.1 MG/ML
0.2 INJECTION INTRAVENOUS
Status: CANCELLED | OUTPATIENT
Start: 2022-08-02 | End: 2022-08-02

## 2022-08-02 RX ORDER — SODIUM CHLORIDE 0.9 % (FLUSH) 0.9 %
5-40 SYRINGE (ML) INJECTION AS NEEDED
Status: CANCELLED | OUTPATIENT
Start: 2022-08-02

## 2022-08-02 RX ORDER — ATROPINE SULFATE 0.1 MG/ML
0.5 INJECTION INTRAVENOUS
Status: CANCELLED | OUTPATIENT
Start: 2022-08-02 | End: 2022-08-03

## 2022-08-02 RX ORDER — DEXTROMETHORPHAN/PSEUDOEPHED 2.5-7.5/.8
1.2 DROPS ORAL
Status: CANCELLED | OUTPATIENT
Start: 2022-08-02

## 2022-08-02 RX ORDER — MIDAZOLAM HYDROCHLORIDE 1 MG/ML
.25-5 INJECTION, SOLUTION INTRAMUSCULAR; INTRAVENOUS
Status: CANCELLED | OUTPATIENT
Start: 2022-08-02 | End: 2022-08-02

## 2022-08-02 RX ORDER — MAGNESIUM CHLORIDE 71.5 G/G
71.5 TABLET ORAL DAILY
COMMUNITY
Start: 2022-05-05

## 2022-08-02 RX ORDER — FENTANYL CITRATE 50 UG/ML
INJECTION, SOLUTION INTRAMUSCULAR; INTRAVENOUS AS NEEDED
Status: DISCONTINUED | OUTPATIENT
Start: 2022-08-02 | End: 2022-08-02 | Stop reason: HOSPADM

## 2022-08-02 RX ORDER — SODIUM CHLORIDE 9 MG/ML
1000 INJECTION, SOLUTION INTRAVENOUS CONTINUOUS
Status: CANCELLED | OUTPATIENT
Start: 2022-08-02 | End: 2022-08-02

## 2022-08-02 RX ORDER — EPINEPHRINE 0.1 MG/ML
1 INJECTION INTRACARDIAC; INTRAVENOUS
Status: CANCELLED | OUTPATIENT
Start: 2022-08-02 | End: 2022-08-03

## 2022-08-02 RX ORDER — OMEPRAZOLE 20 MG/1
1 CAPSULE, DELAYED RELEASE ORAL DAILY
COMMUNITY
Start: 2022-07-31

## 2022-08-02 RX ORDER — NALOXONE HYDROCHLORIDE 0.4 MG/ML
0.4 INJECTION, SOLUTION INTRAMUSCULAR; INTRAVENOUS; SUBCUTANEOUS
Status: CANCELLED | OUTPATIENT
Start: 2022-08-02 | End: 2022-08-02

## 2022-08-02 RX ORDER — SODIUM CHLORIDE 9 MG/ML
125 INJECTION, SOLUTION INTRAVENOUS CONTINUOUS
Status: DISCONTINUED | OUTPATIENT
Start: 2022-08-02 | End: 2022-08-02 | Stop reason: HOSPADM

## 2022-08-02 RX ORDER — FENTANYL CITRATE 50 UG/ML
100 INJECTION, SOLUTION INTRAMUSCULAR; INTRAVENOUS
Status: CANCELLED | OUTPATIENT
Start: 2022-08-02 | End: 2022-08-02

## 2022-08-02 RX ORDER — LEVOTHYROXINE SODIUM 25 UG/1
25 TABLET ORAL DAILY
Status: ON HOLD | COMMUNITY
End: 2022-08-02

## 2022-08-02 RX ORDER — NEBIVOLOL 5 MG/1
10 TABLET ORAL DAILY
Status: ON HOLD | COMMUNITY
End: 2022-08-02

## 2022-08-02 RX ORDER — DIPHENHYDRAMINE HYDROCHLORIDE 50 MG/ML
50 INJECTION, SOLUTION INTRAMUSCULAR; INTRAVENOUS ONCE
Status: CANCELLED | OUTPATIENT
Start: 2022-08-02 | End: 2022-08-02

## 2022-08-02 RX ORDER — RIVAROXABAN 20 MG/1
1 TABLET, FILM COATED ORAL DAILY
COMMUNITY
Start: 2022-07-19

## 2022-08-02 RX ORDER — SODIUM CHLORIDE 0.9 % (FLUSH) 0.9 %
5-40 SYRINGE (ML) INJECTION EVERY 8 HOURS
Status: CANCELLED | OUTPATIENT
Start: 2022-08-02

## 2022-08-02 RX ORDER — MIDAZOLAM HYDROCHLORIDE 1 MG/ML
INJECTION, SOLUTION INTRAMUSCULAR; INTRAVENOUS AS NEEDED
Status: DISCONTINUED | OUTPATIENT
Start: 2022-08-02 | End: 2022-08-02 | Stop reason: HOSPADM

## 2022-08-02 RX ORDER — UREA 10 %
100 LOTION (ML) TOPICAL DAILY
COMMUNITY

## 2022-08-02 RX ADMIN — SODIUM CHLORIDE 125 ML/HR: 9 INJECTION, SOLUTION INTRAVENOUS at 10:36

## 2022-08-02 NOTE — DISCHARGE INSTRUCTIONS
Kay Hawkins  [id number]  1950    EGD DISCHARGE INSTRUCTIONS  Discomfort:  Sore throat- throat lozenges or warm salt water gargle  redness at IV site- apply warm compress to area; if redness or soreness persist- contact your physician  Gaseous discomfort- walking, belching will help relieve any discomfort  You may not operate a vehicle until the next day  You may not engage in an occupation involving machinery or appliances until the next day  You may not drink alcoholic beverages until the next day  Avoid making any critical decisions for at least 24 hour    DIET   You may not resume your regular diet. Antireflux diet. ACTIVITY  You may not resume your normal daily activities   Spend the remainder of the day resting -  avoid any strenuous activity. CALL M.D. ANY SIGN OF   Increasing pain, nausea, vomiting  Abdominal distension (swelling)  New increased bleeding (oral or rectal)  Fever (chills)  Pain in chest area  Bloody discharge from nose or mouth  Shortness of breath     You may not take any Advil, Aspirin, Ibuprofen, Motrin, Aleve, or Goodys  ONLY  Tylenol as needed for pain. Follow-up Instructions: Follow-up in the office as scheduled or make a follow-up appointment in 2 weeks. Carlos Manuel Fernandez MD  August 2, 2022       DISCHARGE SUMMARY from Nurse    The following personal items collected during your admission are returned to you:   Dental Appliance: Dental Appliances: None  Vision: Visual Aid: None  Hearing Aid:    Jewelry:    Clothing:    Other Valuables:    Valuables sent to safe:              PATIENT INSTRUCTIONS:    After general anesthesia or intravenous sedation, for 24 hours or while taking prescription Narcotics:  Limit your activities  Do not drive and operate hazardous machinery  Do not make important personal or business decisions  Do  not drink alcoholic beverages  If you have not urinated within 8 hours after discharge, please contact your surgeon on call.     Report the following to your surgeon:  Excessive pain, swelling, redness or odor of or around the surgical area  Temperature over 100.5  Nausea and vomiting lasting longer than 4 hours or if unable to take medications  Any signs of decreased circulation or nerve impairment to extremity: change in color, persistent  numbness, tingling, coldness or increase pain  Any questions      [unfilled]    What to do at Home:  Recommended activity: as above,     If you experience any of the following symptoms as above, please follow up with Dr. Holli Velazquez. *  Please give a list of your current medications to your Primary Care Provider. *  Please update this list whenever your medications are discontinued, doses are      changed, or new medications (including over-the-counter products) are added. *  Please carry medication information at all times in case of emergency situations. These are general instructions for a healthy lifestyle:    No smoking/ No tobacco products/ Avoid exposure to second hand smoke    Surgeon General's Warning:  Quitting smoking now greatly reduces serious risk to your health. Obesity, smoking, and sedentary lifestyle greatly increases your risk for illness    A healthy diet, regular physical exercise & weight monitoring are important for maintaining a healthy lifestyle    You may be retaining fluid if you have a history of heart failure or if you experience any of the following symptoms:  Weight gain of 3 pounds or more overnight or 5 pounds in a week, increased swelling in our hands or feet or shortness of breath while lying flat in bed. Please call your doctor as soon as you notice any of these symptoms; do not wait until your next office visit.     Recognize signs and symptoms of STROKE:    F-face looks uneven    A-arms unable to move or move unevenly    S-speech slurred or non-existent    T-time-call 911 as soon as signs and symptoms begin-DO NOT go       Back to bed or wait to see if you get better-TIME IS BRAIN. The discharge information has been reviewed with the patient and caregiver. The patient and caregiver verbalized understanding. Warning Signs of HEART ATTACK     Call 911 if you have these symptoms:  Chest discomfort. Most heart attacks involve discomfort in the center of the chest that lasts more than a few minutes, or that goes away and comes back. It can feel like uncomfortable pressure, squeezing, fullness, or pain. Discomfort in other areas of the upper body. Symptoms can include pain or discomfort in one or both arms, the back, neck, jaw, or stomach. Shortness of breath with or without chest discomfort. Other signs may include breaking out in a cold sweat, nausea, or lightheadedness. Don't wait more than five minutes to call 911 - MINUTES MATTER! Fast action can save your life. Calling 911 is almost always the fastest way to get lifesaving treatment. Emergency Medical Services staff can begin treatment when they arrive -- up to an hour sooner than if someone gets to the hospital by car. The discharge information has been reviewed with the patient and caregiver. The patient and caregiver verbalized understanding. Discharge medications reviewed with the patient and guardian and appropriate educational materials and side effects teaching were provided.     Patient armband removed and shredded

## 2022-08-02 NOTE — PROCEDURES
(EGD) Esophagogastroduodenoscopy (UPPER ENDOSCOPY) Procedure Note  [facility] [place]  __________________________________________________________________________________________________________________________      8/2/2022     Patient: Early Pillow YOB: 1950 Gender: female Age: 66 y.o. INDICATION:  2 previous EGD were done first in 2016 where there was a moderate distal esophageal stenosis from scarring that was dilated to 40 Fr and second time 12/ 2020 where she was dilated to 40 Fr. she did well for a year but then had problems mostly with pills where she would chock and aspirate had to go to the ER last 2 months ago. she cuts her pills small. she claims that she has no problem with solids but she does not eat meat. she is always thin and in the last year lost 5 lbs. she is presently 101 lbs. she never had heartburns but she has been on Omeprazole 20 mg daily for many years. she did stop for a couple of months last year. she has no longer abdominal pain. has one bm daily. Last colonoscopy diverticulosis by Dr Shara Nolan. no fx hx of cancer. was found to have A fib 2 months ago on Xarelto now . Has been drinking 2 glass of wine every day. no neck or abd surgery. No neuro problem but Vit B12 was 152 in April started taking B12 injection. that could explain the low platelets at 567. Told her she has to take supplement of Vit B12 for life and stop all alcohol. I am going to send her for barium swallow and EGD where I would do distal esophageal dilatation to 44 or 46 and proximal to > 54 Fr. Barium swallow on July 18, 2022: Gastroesophageal reflux to the upper thoracic esophagus. Barium pill passed without difficulty.    CT scan of the chest 4/21/ 2022: No acute findings in the chest. Specifically, there is no evidence of aspiration    : Yoli Pedroza MD    Referring Provider:  Imelda Marcial DO    Sedation:  Versed 9 mg IV, Fentanyl 100 mcg IV,     Procedure Details:  After infomed consent was obtained for the procedure, with all risks and benefits of procedure explained to the family the patient was taken to the endoscopy suite and placed in the left lateral decubitus position. Following sequential administration of sedation as per above, the endoscope was inserted into the mouth and advanced under direct vision to the fourth portion of the duodenum. A careful inspection was made as the gastroscope was withdrawn, including a retroflexed view of the proximal stomach; findings and interventions are described below. OROPHARYNX: The vocal Cords and the larynx are normal.   ESOPHAGUS: The proximal and mid oesophagus are normal. There is mild stenosis in the distal esophagus with few tiny successive rings. The Z-Line is slightly stenotic like a ring located at:41 cm. Tiny > 1 cm reducible Hiatal hernia. Diaphragmatic opening or notch is located at 42 cm. At the end of the procedure I performed an esophageal dilatation using Begum bougies # 42, 40, 42, 44 and 55 Fr passed all with mild distal resistance after 40 cm. Repeat endoscopy after the 40 Fr, I noticed a small expected mucosal tear 3 mm at the level of the Ring at the Z line  STOMACH: the stomach is small. No evidence of blood, fluid or solid food retention. The fundus on antegrade and retroflex views is normal. The cardia, body, lesser curvature, greater curvature, the antrum, and pylorus are normal. There is very mild diffuse gastritis more severe in the antrum. Bx already taken in 12/ 2020 no H Pylori. DUODENUM: There is scarring at the distal bulb suggestive of previously well healed PUD. There are two successive rings in the mid D2 the first is large the second is smaller containing some bezoar, obstructing the view but suspect it contains the major papilla. The proximal bulb, the third and the fourth portions are normal. PROXIMAL JEJUNUM:  Not examined.     Therapies:  esophageal dilatation using Laquita Grippe bougies # 42, 40, 42, 44 and 55 Fr passed all with mild distal resistance after 40 cm. Repeat endoscopy after the 44 Fr, I noticed a small expected mucosal tear 3 mm at the level of the Ring at the Z line    Specimen: none           Complications:   None    EBL:  Negligible. IMPLANTS: * No implants in log *  IMPRESSION: There is mild stenosis in the distal esophagus with few tiny successive rings. The Z-Line is slightly stenotic like a ring located at:41 cm. Tiny > 1 cm reducible Hiatal hernia. Diaphragmatic opening or notch is located at 42 cm. At the end of the procedure I performed an esophageal dilatation using Begum bougies # 42, 40, 42, 44 and 55 Fr passed all with mild distal resistance after 40 cm. Repeat endoscopy after the 40 Fr, I noticed a small expected mucosal tear 3 mm at the level of the Ring at the Z line  The stomach is small. No evidence of blood, fluid or solid food retention. There is very mild diffuse gastritis more severe in the antrum. Bx already taken in 12/ 2020 no H Pylori. There is scarring at the distal bulb suggestive of previously well healed PUD. There are two successive rings in the mid D2 the first is large the second is smaller containing some bezoar, obstructing the view but suspect it contains the major papilla? RECOMMENDATION:  May resume antireflux diet. Chew well food and avoid dense meat. Avoid NSAID's. Make a FU appointment at the office. Resume taking medications including Omeprazole 20 mg daily for life. Resume Xarelto tomorrow.      Assistant: None    --Karthik Green MD on 8/2/2022 at 11:13 AM

## 2022-08-02 NOTE — H&P
Assessment/Plan  # Detail Type Description    1. Assessment Dysphagia, unspecified type (R13.10). Impression last EGD 12/14/2020  Mild stenotic scarring in the distal esophagus. The Z-Line is moderately stenotic but horizontal permitting this time the passage of the gastroscope. It was dilated with [email or web address] bougies # 34, 36, 38, 40 and 42 Fr on repeat endoscopy there is only one minor mucosal tear at the ring level. Another 40 Fr Begum bougie is passed all without great resistance. There is a tiny hiatal hernia. There is possibly mild diffuse gastritis more noticeable in  The  Antrum. 5 biopsies are taken. There is mild stenotic scarring in the distal bulb, suggestive of previous well healed PUD. There are 2 successive medium and large diverticulum in the mid D2        Gastric biopsy:       Mild chronic gastritis, negative for Helicobacter. .    Patient Plan I performed 2 EGD on her in 2016 where there was a moderate distal esophageal stenosis from scarring that was dilated to 40 Fr and second time 12/ 2020 where she was dilated to 40 Fr. she did well for a year but then had problems mostly with pills where she would chock and aspirate had to go to the ER last 2 months ago. she cuts her pills small. she claims that she has no problem with solids but she does not eat meat. she is always thin and in the last year lost 5 lbs. she is presently 101 lbs. she never had heartburns but she has been on Omeprazole 20 mg daily for many years. she did stop for a couple of months last year. she has no longer abdominal pain. has one bm daily. last colonoscopy diverticulosis by Dr Jerome Reed. no fx hx of cancer. was found to have A fib 2 months ago on Xarelto now . Has been drinking 2 glass of wine every day. no neck or abd surgery. No neuro problem but Vit B12 was 152 in April started taking B12 injection. that could explain the low platelets at 826. Told her she has to take supplement for life and stop all alcohol.   I am going to send her for barium swallow and EGD where I would do distal esophageal dilatation to 4 or 46 and proximal to > 54 Fr. I explained to her the procedure of upper endoscopy or EGD, the alternative and the risks involved which include but not limited to aspiration, bleeding perforation or reaction to sedation. She was agreeable to this. Plan Orders Further diagnostic evaluations ordered today include(s) Barium Swallow to be performed. This 66year old  patient was referred by Kiera Zavala. This 66year old female presents for Dysphagia. History of Present Illness  1. Dysphagia   The difficulty in swallowing began 7 years ago and lasted varies. The symptoms are moderate, worsened and occur constantly. The symptoms occur with solids only which cause gagging with food sticking in the throat. The difficulty in swallowing is worsened by medications. The patient is also experiencing choking and cough following swallowing. The patient denies back pain, chest pain, cough, decreased appetite, food on pillow in AM, heartburn, nausea, sore throat, vomiting or weight loss. Additional information:  last EGD  12/14/2020 Bm. x daily.           Problem List  Problem Description Onset Date Chronic Clinical Status Notes   Suprapubic pain 06/01/2016 N     Difficulty swallowing solids 06/01/2016 N     Pseudodiverticulum of the rectum 08/10/2016 N     Congenital esophageal fistula 08/10/2016 N     Depressive disorder 02/24/2014 Y     Asthma 02/24/2014 Y     Acquired hypothyroidism 02/24/2014 Y     Hypothyroidism 02/24/2014 Y     Elevated levels of transaminase & lactic acid dehydrogenase 02/24/2014 Y     Hyperlipidemia 02/24/2014 Y     Vitamin B12 deficiency  N         Medications (active prior to today)  Medication Instructions Start Date Stop Date Refilled Elsewhere   docusate sodium 100 mg tablet take 1 tablet by oral route  every day at bedtime as needed 05/31/2016   N   hydrocortisone 1 % topical cream with perineal applicator apply by topical route 2- 4 times every day a thin layer to the affected area(s) 01/28/2020   N   Vitamin D3 25 mcg (1,000 unit) capsule Take 1 Tablet By Oral Route Daily 07/28/2020   N   Centrum Chewables 8 mg iron-400 mcg-10 mcg tablet  07/28/2020   N   albuterol sulfate HFA 90 mcg/actuation aerosol inhaler inhale 2 puff by inhalation route  every 4 - 6 hours as needed 07/28/2020   N   Levsin/SL 0.125 mg sublingual tablet take 1 tablet by oral route  every 3 hours as needed for abdominal pain 11/24/2020   N   hydrocortisone 2.5 % topical cream apply by topical route 2-4 times every day a thin layer to the affected area(s) 08/05/2021 08/05/2021 N   omeprazole 20 mg capsule,delayed release TAKE 1 CAPSULE BY ORAL ROUTE EVERY DAY BEFORE A MEAL 08/20/2021 08/20/2021 N   levothyroxine 25 mcg tablet take 1 tablet by mouth daily 12/27/2021 12/27/2021 N   temazepam 22.5 mg capsule take 1 capsule by oral route  every day at bedtime as needed for insomnia 03/09/2022 03/09/2022 N   Xarelto 20 mg tablet take 1 tablet by oral route  every day with the evening meal for Afib 04/26/2022   N   Slow-Mag 71.5 mg tablet,delayed release take 1 tablet by mouth once daily 05/05/2022   N   NEBIVOLOL 5 MG TABLET take 1 tablet by mouth once daily 05/22/2022 05/22/2022 N   lidocaine 5 % topical patch apply 1 patch by transdermal route  every day (May wear up to 12hours.) 06/23/2022 06/23/2022 N   Xarelto 20 mg tablet take 1 tablet by oral route  every day with the evening meal 06/28/2022 06/28/2022 N       Medication Reconciliation  Medications reconciled today.     Medication Reviewed  Adherence Medication Name Sig Desc Elsewhere Status   taking as directed Xarelto 20 mg tablet take 1 tablet by oral route  every day with the evening meal N Verified   taking as directed Xarelto 20 mg tablet take 1 tablet by oral route  every day with the evening meal for Afib N Verified   taking as directed albuterol sulfate HFA 90 mcg/actuation aerosol inhaler inhale 2 puff by inhalation route  every 4 - 6 hours as needed N Verified   taking as directed hydrocortisone 2.5 % topical cream apply by topical route 2-4 times every day a thin layer to the affected area(s) N Verified   taking as directed docusate sodium 100 mg tablet take 1 tablet by oral route  every day at bedtime as needed N Verified   taking as directed levothyroxine 25 mcg tablet take 1 tablet by mouth daily N Verified   taking as directed omeprazole 20 mg capsule,delayed release TAKE 1 CAPSULE BY ORAL ROUTE EVERY DAY BEFORE A MEAL N Verified   taking as directed hydrocortisone 1 % topical cream with perineal applicator apply by topical route 2- 4 times every day a thin layer to the affected area(s) N Verified   taking as directed temazepam 22.5 mg capsule take 1 capsule by oral route  every day at bedtime as needed for insomnia N Verified   taking as directed Levsin/SL 0.125 mg sublingual tablet take 1 tablet by oral route  every 3 hours as needed for abdominal pain N Verified   taking as directed NEBIVOLOL 5 MG TABLET take 1 tablet by mouth once daily N Verified   taking as directed Centrum Chewables 8 mg iron-400 mcg-10 mcg tablet  N Verified   taking as directed Slow-Mag 71.5 mg tablet,delayed release take 1 tablet by mouth once daily N Verified   taking as directed lidocaine 5 % topical patch apply 1 patch by transdermal route  every day (May wear up to 12hours.) N Verified   taking as directed Vitamin D3 25 mcg (1,000 unit) capsule Take 1 Tablet By Oral Route Daily N Verified     Medications (Added, Continued or Stopped today)  Start Date Medication Directions PRN Status PRN Reason Instruction Stop Date   07/28/2020 albuterol sulfate HFA 90 mcg/actuation aerosol inhaler inhale 2 puff by inhalation route  every 4 - 6 hours as needed N      07/28/2020 Centrum Chewables 8 mg iron-400 mcg-10 mcg tablet  N      05/31/2016 docusate sodium 100 mg tablet take 1 tablet by oral route  every day at bedtime as needed N      01/28/2020 hydrocortisone 1 % topical cream with perineal applicator apply by topical route 2- 4 times every day a thin layer to the affected area(s) N      08/05/2021 hydrocortisone 2.5 % topical cream apply by topical route 2-4 times every day a thin layer to the affected area(s) N      12/27/2021 levothyroxine 25 mcg tablet take 1 tablet by mouth daily N      11/24/2020 Levsin/SL 0.125 mg sublingual tablet take 1 tablet by oral route  every 3 hours as needed for abdominal pain N      06/23/2022 lidocaine 5 % topical patch apply 1 patch by transdermal route  every day (May wear up to 12hours.) N      05/22/2022 NEBIVOLOL 5 MG TABLET take 1 tablet by mouth once daily N      08/20/2021 omeprazole 20 mg capsule,delayed release TAKE 1 CAPSULE BY ORAL ROUTE EVERY DAY BEFORE A MEAL N      05/05/2022 Slow-Mag 71.5 mg tablet,delayed release take 1 tablet by mouth once daily N      03/09/2022 temazepam 22.5 mg capsule take 1 capsule by oral route  every day at bedtime as needed for insomnia N      07/28/2020 Vitamin D3 25 mcg (1,000 unit) capsule Take 1 Tablet By Oral Route Daily N      04/26/2022 Xarelto 20 mg tablet take 1 tablet by oral route  every day with the evening meal for Afib N      06/28/2022 Xarelto 20 mg tablet take 1 tablet by oral route  every day with the evening meal N        Allergies  Ingredient Reaction (Severity) Medication Name Comment   CITALOPRAM nausea; dizzy; drowsiness     CODEINE gi distress (Unknown)       Reviewed, no changes. Review of Systems  System Neg/Pos Details   Constitutional Negative Chills, Fever, Malaise and Weight loss. ENMT Negative Ear infections, Nasal congestion, Sinus Infection and Sore throat. Eyes Negative Double vision and Eye pain. Respiratory Negative Asthma, Chronic cough, Cough, Dyspnea, Pleuritic pain and Wheezing. Cardio Negative Chest pain, Edema and Irregular heartbeat/palpitations.    GI Positive Choking, Cough following swallowing. GI Negative Abdominal pain, Change in bowel habits, Constipation, Decreased appetite, Diarrhea, Dysphagia, Food on pillow in AM, Heartburn, Hematemesis, Hematochezia, Melena, Nausea, Reflux and Vomiting.  Negative Dysuria, Hematuria, Urinary frequency, Urinary incontinence and Urinary retention. Endocrine Negative Cold intolerance, Heat intolerance and Increased thirst.   Neuro Negative Dizziness, Headache, Numbness, Tremors and Vertigo. Psych Negative Anxiety, Depression and Increased stress. Integumentary Negative Hives, Pruritus and Rash. MS Negative Back pain, Joint pain and Myalgia. Hema/Lymph Negative Easy bleeding, Easy bruising and Lymphadenopathy. Reproductive Negative Breast lumps, Breast pain and Vaginal discharge. Vital Signs   Height  Time ft in cm Last Measured Height Position   9:41 AM 5.0 6.00 167.64 07/14/2022 Standing     Weight/BSA/BMI  Time lb oz kg Context BMI kg/m2 BSA m2   9:41 .00  45.813 dressed with shoes 16.30      Date/Time Temp Pulse BP Arterial Line 1 BP (mmHg) BP Patient Position Resp SpO2 O2 Device O2 Flow Rate (L/min) Level of Consciousness MEWS Score Weight   08/02/22 1022 98.4 °F (36.9 °C) 82 172/64 Abnormal  -- -- 16 100 % None (Room air) -- Alert (0) 1 45.2 kg (99 lb 11.2 oz)     Physical  Exam  Exam Findings Details   Constitutional Normal No acute distress. Well Nourished. Well developed.    Eyes Normal General - Right: Normal, Left: Normal. Conjunctiva - Right: Normal, Left: Normal. Sclera - Right: Normal, Left: Normal. Cornea - Right: Normal, Left: Normal. Pupil - Right: Normal, Left: Normal.   Nose/Mouth/Throat Normal Lips/teeth/gums - Normal. Tongue - Normal. Buccal mucosa - Normal. Palate & uvula - Normal.   Neck Exam Normal Inspection - Normal. Palpation - Normal. Thyroid gland - Normal. Cervical lymph nodes - Normal.   Respiratory Normal Inspection - Normal. Auscultation - Normal. Percussion - Normal. Cough - Absent. Effort - Normal.   Cardiovascular * Rhythm - Irregularly irregular. Cardiovascular Normal Heart sounds - Normal S1, Normal S2. Murmurs - None. Extremities - No edema. Abdomen Normal Inspection - Normal. Appliance(s) - None. Abdominal muscles - Normal. Auscultation - Normal. Percussion - Normal. Anterior palpation - Normal, No guarding, No rebound. CVA tenderness - None. Umbilicus - Normal. Abdominal reflexes - Normal. No abdominal tenderness. No hepatic enlargement. No splenic enlargement. No hernia. No Ascites. No palpable mass. Adams's sign - Negative. Skin Normal Inspection - Normal.   Musculoskeletal Normal Hands - Left: Normal, Right: Normal.   Extremity Normal No cyanosis. No edema. Clubbing - Absent. Neurological Normal Level of consciousness - Normal. Orientation - Normal. Memory - Normal. Motor - Normal. Balance & gait - Normal. Coordination - Normal. Fine motor skills - Normal. DTRs - Normal.   Psychiatric Normal Orientation - Oriented to time, place, person & situation. Not anxious. Appropriate mood and affect. Behavior appropriate for age. Sufficient language. No memory loss.    Immunizations Entered by History  Date Immunization   4/15/2022 12:00:00 AM SARS-COV-2 (COVID-19) vaccine, mRNA, spike protein, LNP, preservative free, 30 mcg/0.3mL dose   11/3/2021 12:00:00 AM Influenza, injectable, quadrivalent, preservative free   10/6/2021 12:00:00 AM SARS-COV-2 (COVID-19) vaccine, mRNA, spike protein, LNP, preservative free, 30 mcg/0.3mL dose   10/8/2020 12:00:00 AM FLUAD QUAD [id number] SYRINGE   10/1/2019 12:00:00 AM FLUAD [id number] SYRINGE   10/3/2017 12:00:00 AM Flu (3 yrs or older)   10/25/2016 12:00:00 AM Flu (3 yrs or older)   9/12/2013 12:00:00 AM flu (split) (3 yrs or older)       Active Patient Care Team Members  Name Contact Agency Type Support Role Relationship Active Date Inactive Date Specialty   [address] [place] [address]   Patient provider PCP   Family Practice   Natividad Ramires   encounter provider    Gastroenterology   Bevely Vickie   Emergency Contact Extended Family      [facility]       Dermatology             No change in H&P

## 2023-07-24 ENCOUNTER — HOSPITAL ENCOUNTER (OUTPATIENT)
Facility: HOSPITAL | Age: 73
Discharge: HOME | End: 2023-07-27
Payer: MEDICARE

## 2023-07-24 LAB
INR PPP: 1.2 (ref 0.8–1.2)
PROTHROMBIN TIME: 15.1 SEC (ref 11.5–15.2)

## 2023-07-24 PROCEDURE — 85610 PROTHROMBIN TIME: CPT

## 2023-07-24 PROCEDURE — 36415 COLL VENOUS BLD VENIPUNCTURE: CPT

## 2023-07-26 LAB
FAX TO NUMBER: NORMAL
TEST RESULTS FAXED TO: NORMAL

## 2025-05-19 ENCOUNTER — TRANSCRIBE ORDERS (OUTPATIENT)
Facility: HOSPITAL | Age: 75
End: 2025-05-19

## 2025-05-19 DIAGNOSIS — I48.0 PAROXYSMAL ATRIAL FIBRILLATION (HCC): Primary | ICD-10-CM

## 2025-06-05 ENCOUNTER — HOSPITAL ENCOUNTER (OUTPATIENT)
Facility: HOSPITAL | Age: 75
Discharge: HOME | End: 2025-06-07
Attending: INTERNAL MEDICINE
Payer: MEDICARE

## 2025-06-05 VITALS
SYSTOLIC BLOOD PRESSURE: 175 MMHG | DIASTOLIC BLOOD PRESSURE: 81 MMHG | WEIGHT: 100 LBS | BODY MASS INDEX: 16.07 KG/M2 | HEART RATE: 79 BPM | HEIGHT: 66 IN

## 2025-06-05 DIAGNOSIS — I48.0 PAROXYSMAL ATRIAL FIBRILLATION (HCC): ICD-10-CM

## 2025-06-05 PROCEDURE — 93306 TTE W/DOPPLER COMPLETE: CPT

## 2025-06-07 LAB
ECHO AO ASC DIAM: 3 CM
ECHO AO ASCENDING AORTA INDEX: 2.01 CM/M2
ECHO AO ROOT DIAM: 3.1 CM
ECHO AO ROOT INDEX: 2.08 CM/M2
ECHO AV AREA PEAK VELOCITY: 2.6 CM2
ECHO AV AREA VTI: 2.5 CM2
ECHO AV AREA/BSA PEAK VELOCITY: 1.7 CM2/M2
ECHO AV AREA/BSA VTI: 1.7 CM2/M2
ECHO AV MEAN GRADIENT: 2 MMHG
ECHO AV MEAN VELOCITY: 0.7 M/S
ECHO AV PEAK GRADIENT: 4 MMHG
ECHO AV PEAK VELOCITY: 1 M/S
ECHO AV VELOCITY RATIO: 0.9
ECHO AV VTI: 25.3 CM
ECHO BSA: 1.45 M2
ECHO EST RA PRESSURE: 3 MMHG
ECHO LA DIAMETER INDEX: 2.15 CM/M2
ECHO LA DIAMETER: 3.2 CM
ECHO LA TO AORTIC ROOT RATIO: 1.03
ECHO LA VOL A-L A2C: 32 ML (ref 22–52)
ECHO LA VOL A-L A4C: 34 ML (ref 22–52)
ECHO LA VOL BP: 36 ML (ref 22–52)
ECHO LA VOL MOD A2C: 29 ML (ref 22–52)
ECHO LA VOL MOD A4C: 34 ML (ref 22–52)
ECHO LA VOL/BSA BIPLANE: 24 ML/M2 (ref 16–34)
ECHO LA VOLUME AREA LENGTH: 38 ML
ECHO LA VOLUME INDEX A-L A2C: 21 ML/M2 (ref 16–34)
ECHO LA VOLUME INDEX A-L A4C: 23 ML/M2 (ref 16–34)
ECHO LA VOLUME INDEX AREA LENGTH: 26 ML/M2 (ref 16–34)
ECHO LA VOLUME INDEX MOD A2C: 19 ML/M2 (ref 16–34)
ECHO LA VOLUME INDEX MOD A4C: 23 ML/M2 (ref 16–34)
ECHO LV E' LATERAL VELOCITY: 8.11 CM/S
ECHO LV E' SEPTAL VELOCITY: 5.41 CM/S
ECHO LV EDV A2C: 41 ML
ECHO LV EDV A4C: 33 ML
ECHO LV EDV BP: 40 ML (ref 56–104)
ECHO LV EDV INDEX A4C: 22 ML/M2
ECHO LV EDV INDEX BP: 27 ML/M2
ECHO LV EDV NDEX A2C: 28 ML/M2
ECHO LV EF PHYSICIAN: 65 %
ECHO LV EJECTION FRACTION A2C: 66 %
ECHO LV EJECTION FRACTION A4C: 71 %
ECHO LV EJECTION FRACTION BIPLANE: 66 % (ref 55–100)
ECHO LV ESV A2C: 14 ML
ECHO LV ESV A4C: 10 ML
ECHO LV ESV BP: 13 ML (ref 19–49)
ECHO LV ESV INDEX A2C: 9 ML/M2
ECHO LV ESV INDEX A4C: 7 ML/M2
ECHO LV ESV INDEX BP: 9 ML/M2
ECHO LV FRACTIONAL SHORTENING: 28 % (ref 28–44)
ECHO LV INTERNAL DIMENSION DIASTOLE INDEX: 2.62 CM/M2
ECHO LV INTERNAL DIMENSION DIASTOLIC: 3.9 CM (ref 3.9–5.3)
ECHO LV INTERNAL DIMENSION SYSTOLIC INDEX: 1.88 CM/M2
ECHO LV INTERNAL DIMENSION SYSTOLIC: 2.8 CM
ECHO LV IVSD: 1 CM (ref 0.6–0.9)
ECHO LV MASS 2D: 105.3 G (ref 67–162)
ECHO LV MASS INDEX 2D: 70.7 G/M2 (ref 43–95)
ECHO LV POSTERIOR WALL DIASTOLIC: 0.8 CM (ref 0.6–0.9)
ECHO LV RELATIVE WALL THICKNESS RATIO: 0.41
ECHO LVOT AREA: 3.1 CM2
ECHO LVOT AV VTI INDEX: 0.83
ECHO LVOT DIAM: 2 CM
ECHO LVOT MEAN GRADIENT: 2 MMHG
ECHO LVOT PEAK GRADIENT: 3 MMHG
ECHO LVOT PEAK VELOCITY: 0.9 M/S
ECHO LVOT STROKE VOLUME INDEX: 44 ML/M2
ECHO LVOT SV: 65.6 ML
ECHO LVOT VTI: 20.9 CM
ECHO MV A VELOCITY: 0.72 M/S
ECHO MV AREA VTI: 2.8 CM2
ECHO MV E DECELERATION TIME (DT): 182.2 MS
ECHO MV E VELOCITY: 0.59 M/S
ECHO MV E/A RATIO: 0.82
ECHO MV E/E' LATERAL: 7.27
ECHO MV E/E' RATIO (AVERAGED): 9.09
ECHO MV E/E' SEPTAL: 10.91
ECHO MV LVOT VTI INDEX: 1.13
ECHO MV MAX VELOCITY: 0.8 M/S
ECHO MV MEAN GRADIENT: 1 MMHG
ECHO MV MEAN VELOCITY: 0.5 M/S
ECHO MV PEAK GRADIENT: 3 MMHG
ECHO MV VTI: 23.6 CM
ECHO PV MAX VELOCITY: 0.8 M/S
ECHO PV MEAN GRADIENT: 1 MMHG
ECHO PV MEAN VELOCITY: 0.6 M/S
ECHO PV PEAK GRADIENT: 2 MMHG
ECHO RA END SYSTOLIC VOLUME APICAL 4 CHAMBER INDEX BSA: 11 ML/M2
ECHO RA VOLUME: 16 ML
ECHO RIGHT VENTRICULAR SYSTOLIC PRESSURE (RVSP): 33 MMHG
ECHO RV FREE WALL PEAK S': 10.9 CM/S
ECHO RV INTERNAL DIMENSION: 2.7 CM
ECHO RV TAPSE: 1.7 CM (ref 1.7–?)
ECHO RVOT MEAN GRADIENT: 1 MMHG
ECHO RVOT PEAK GRADIENT: 1 MMHG
ECHO RVOT PEAK VELOCITY: 0.5 M/S
ECHO RVOT VTI: 8.5 CM
ECHO TV REGURGITANT MAX VELOCITY: 2.72 M/S
ECHO TV REGURGITANT PEAK GRADIENT: 30 MMHG

## (undated) DEVICE — TUBING, SUCTION, 1/4" X 12', STRAIGHT: Brand: MEDLINE

## (undated) DEVICE — TRAP SPEC COLL POLYP POLYSTYR --

## (undated) DEVICE — SPONGE GZ W4XL4IN COT 12 PLY TYP VII WVN C FLD DSGN

## (undated) DEVICE — MOUTHPIECE ENDOSCP 20X27MM --

## (undated) DEVICE — GARMENT,MEDLINE,DVT,INT,CALF,MED, GEN2: Brand: MEDLINE

## (undated) DEVICE — SINGLE PORT MANIFOLD: Brand: NEPTUNE 2

## (undated) DEVICE — REM POLYHESIVE ADULT PATIENT RETURN ELECTRODE: Brand: VALLEYLAB

## (undated) DEVICE — KENDALL RADIOLUCENT FOAM MONITORING ELECTRODE RECTANGULAR SHAPE: Brand: KENDALL

## (undated) DEVICE — MAJ-1414 SINGLE USE ADPATER BIOPSY VALV: Brand: SINGLE USE ADAPTOR BIOPSY VALVE

## (undated) DEVICE — Device